# Patient Record
(demographics unavailable — no encounter records)

---

## 2024-12-11 NOTE — REVIEW OF SYSTEMS
[Vomiting] : no vomiting [Constipation] : no constipation [Diarrhea] : no diarrhea [Dysuria] : dysuria [Pelvic Pain] : pelvic pain [Abn Vag Bleeding] : no abnormal vaginal bleeding [Nl] : Integumentary

## 2024-12-11 NOTE — PHYSICAL EXAM
[Chaperone Declined] : Patient declined chaperone [Labia Majora] : labia major [Labia Minora] : labia minora [Normal] : clitoris [Moderate] : there was moderate vaginal bleeding [Motion Tenderness] : there was no cervical motion tenderness [Normal Position] : in a normal position [Tenderness] : nontender [Enlarged ___ wks] : not enlarged [Mass ___ cm] : no uterine mass was palpated [Uterine Adnexae] : were not tender and not enlarged

## 2024-12-11 NOTE — HISTORY OF PRESENT ILLNESS
[Lower-Rt-Q] : lower right quadrant [Dull] : dull [Sharp] : sharp [___ Days] : started [unfilled] days ago [3/10] : is 3/10 in severity [Fever] : no fever [Nausea] : no nausea [Vomiting] : no vomiting [Diarrhea] : no diarrhea [Vaginal Discharge] : no vaginal discharge [Vaginal Bleeding] : no vaginal bleeding [Pelvic Pressure] : pelvic pressure [Dysuria] : dysuria [Pain with BMs] : pain with bowel movements [Dysmenorrhea] : no dysmenorrhea [Rest] : improved by rest [Defecation] : worsened by defecation [Lifting] : worsened by lifting [Movement] : worsened by movement [Urination] : worsened by urination

## 2024-12-11 NOTE — DISCUSSION/SUMMARY
[FreeTextEntry1] : Patient reassured of normal vaginal exam Cultures obtained to r/o infection Order for pelvic sono placed Comfort measures discussed RTO for annual or sooner PRN

## 2024-12-11 NOTE — CHIEF COMPLAINT
[Urgent Visit] : Urgent Visit [FreeTextEntry1] : Patient presents with complaints of right pelvic pain x 1 month. Patient states she originally thought she had a urinary tract infection because she had pain with urination. Patient increased her hydration and her discomfort with urination improved however pain went from sharp to dull and achy. Patient also reports some discomfort with bowel movements and feeling as if she is not completely moving her bowels. The patient is sure her pain is unrelated to her menses as she had the pain the whole month. Patient has never been sexually active, currently on her period, and sometimes uses menstrual cup. Denies abnormal vaginal discharge, odor, fever, and chills.

## 2025-06-02 NOTE — PROCEDURE
[IUD Placement] : intrauterine device (IUD) placement [Time out performed] : Pre-procedure time out performed.  Patient's name, date of birth and procedure confirmed. [Consent Obtained] : Consent obtained [Prevention of Pregnancy] : prevention of pregnancy [Risks] : risks [Benefits] : benefits [Alternatives] : alternatives [Patient] : patient [Infection] : infection [Bleeding] : bleeding [Pain] : pain [Expulsion] : expulsion [Failure] : failure [Uterine Perforation] : uterine perforation [Neg Pregnancy Test] : negative pregnancy test [Cytotec] : Cytotec [Betadine] : Betadine [Post Placement Transvag. US] : post placement transvaginal ultrasound [Kyleena IUD] : Kyleena IUD [Tolerated Well] : Patient tolerated the procedure well [No Complications] : No complications [de-identified] : CPP [de-identified] : Ari clamp [de-identified] : [id number] [de-identified] : 3/2027 [de-identified] : 6/2030